# Patient Record
Sex: MALE | Race: OTHER | NOT HISPANIC OR LATINO | URBAN - METROPOLITAN AREA
[De-identification: names, ages, dates, MRNs, and addresses within clinical notes are randomized per-mention and may not be internally consistent; named-entity substitution may affect disease eponyms.]

---

## 2024-03-07 ENCOUNTER — OFFICE (OUTPATIENT)
Dept: URBAN - METROPOLITAN AREA CLINIC 11 | Facility: CLINIC | Age: 64
End: 2024-03-07

## 2024-03-07 VITALS
SYSTOLIC BLOOD PRESSURE: 136 MMHG | HEIGHT: 74 IN | HEART RATE: 87 BPM | OXYGEN SATURATION: 96 % | DIASTOLIC BLOOD PRESSURE: 74 MMHG | WEIGHT: 273 LBS

## 2024-03-07 DIAGNOSIS — K21.9 GASTRO-ESOPHAGEAL REFLUX DISEASE WITHOUT ESOPHAGITIS: ICD-10-CM

## 2024-03-07 DIAGNOSIS — R13.10 DYSPHAGIA, UNSPECIFIED: ICD-10-CM

## 2024-03-07 PROCEDURE — 99204 OFFICE O/P NEW MOD 45 MIN: CPT | Performed by: NURSE PRACTITIONER

## 2024-03-07 RX ORDER — SODIUM PICOSULFATE, MAGNESIUM OXIDE, AND ANHYDROUS CITRIC ACID 10; 3.5; 12 MG/160ML; G/160ML; G/160ML
LIQUID ORAL
Qty: 350 | Refills: 0 | Status: COMPLETED
Start: 2024-03-07 | End: 2024-04-02

## 2024-03-07 NOTE — SERVICENOTES
will get EGD to further eval dysphagia. he will continue omeprazole for GERD. overdue for LO. will get scheduled and get records from Dr. Francisco. will see back in 3 months ** he currently is not on Ozempic but discussed if he restarted the medication he would  need to hold medication 1 week prior to procedure

## 2024-03-07 NOTE — SERVICEHPINOTES
Mr. Harrison is a 63 year old male referred for GERD and dysphagia. He has a history of GERD that is well controlled on Omeprazole 40mg daily. He states for the past year he has had multiple episodes of feeling like food gets stuck in his throat when swallows. He denies any choking. He has some gas and bloating that is relieved with a bowel movement. He has pretty regular BMs and denies in change in bowel habits or blood in stool. He denies any n/v, heartburn, reflux, abdominal pain. He had a colonoscopy by Dr. Francisco 11 years ago.

## 2024-04-02 ENCOUNTER — AMBULATORY SURGICAL CENTER (OUTPATIENT)
Dept: URBAN - METROPOLITAN AREA SURGERY 2 | Facility: SURGERY | Age: 64
End: 2024-04-02
Payer: COMMERCIAL

## 2024-04-02 ENCOUNTER — OFFICE (OUTPATIENT)
Dept: URBAN - METROPOLITAN AREA PATHOLOGY 12 | Facility: PATHOLOGY | Age: 64
End: 2024-04-02
Payer: COMMERCIAL

## 2024-04-02 ENCOUNTER — AMBULATORY SURGICAL CENTER (OUTPATIENT)
Dept: URBAN - METROPOLITAN AREA SURGERY 2 | Facility: SURGERY | Age: 64
End: 2024-04-02

## 2024-04-02 VITALS
HEART RATE: 73 BPM | WEIGHT: 264 LBS | TEMPERATURE: 97.5 F | HEART RATE: 60 BPM | SYSTOLIC BLOOD PRESSURE: 137 MMHG | DIASTOLIC BLOOD PRESSURE: 75 MMHG | HEART RATE: 73 BPM | SYSTOLIC BLOOD PRESSURE: 131 MMHG | RESPIRATION RATE: 14 BRPM | OXYGEN SATURATION: 94 % | DIASTOLIC BLOOD PRESSURE: 77 MMHG | DIASTOLIC BLOOD PRESSURE: 77 MMHG | RESPIRATION RATE: 18 BRPM | OXYGEN SATURATION: 98 % | RESPIRATION RATE: 14 BRPM | DIASTOLIC BLOOD PRESSURE: 75 MMHG | OXYGEN SATURATION: 93 % | HEART RATE: 68 BPM | HEIGHT: 74 IN | HEART RATE: 68 BPM | OXYGEN SATURATION: 94 % | DIASTOLIC BLOOD PRESSURE: 91 MMHG | SYSTOLIC BLOOD PRESSURE: 137 MMHG | RESPIRATION RATE: 18 BRPM | DIASTOLIC BLOOD PRESSURE: 67 MMHG | SYSTOLIC BLOOD PRESSURE: 147 MMHG | RESPIRATION RATE: 16 BRPM | OXYGEN SATURATION: 99 % | HEART RATE: 68 BPM | DIASTOLIC BLOOD PRESSURE: 75 MMHG | RESPIRATION RATE: 16 BRPM | DIASTOLIC BLOOD PRESSURE: 79 MMHG | HEART RATE: 60 BPM | HEART RATE: 65 BPM | HEIGHT: 74 IN | HEART RATE: 65 BPM | OXYGEN SATURATION: 93 % | TEMPERATURE: 97.5 F | TEMPERATURE: 97.2 F | DIASTOLIC BLOOD PRESSURE: 67 MMHG | HEART RATE: 69 BPM | OXYGEN SATURATION: 98 % | SYSTOLIC BLOOD PRESSURE: 147 MMHG | HEART RATE: 69 BPM | OXYGEN SATURATION: 99 % | SYSTOLIC BLOOD PRESSURE: 131 MMHG | DIASTOLIC BLOOD PRESSURE: 79 MMHG | SYSTOLIC BLOOD PRESSURE: 130 MMHG | HEIGHT: 74 IN | HEART RATE: 69 BPM | WEIGHT: 264 LBS | DIASTOLIC BLOOD PRESSURE: 91 MMHG | SYSTOLIC BLOOD PRESSURE: 130 MMHG | TEMPERATURE: 97.2 F | DIASTOLIC BLOOD PRESSURE: 79 MMHG | DIASTOLIC BLOOD PRESSURE: 67 MMHG | SYSTOLIC BLOOD PRESSURE: 127 MMHG | WEIGHT: 264 LBS | OXYGEN SATURATION: 99 % | DIASTOLIC BLOOD PRESSURE: 77 MMHG | HEART RATE: 60 BPM | OXYGEN SATURATION: 98 % | OXYGEN SATURATION: 93 % | RESPIRATION RATE: 14 BRPM | SYSTOLIC BLOOD PRESSURE: 131 MMHG | SYSTOLIC BLOOD PRESSURE: 127 MMHG | HEART RATE: 73 BPM | TEMPERATURE: 97.2 F | TEMPERATURE: 97.5 F | HEART RATE: 65 BPM | RESPIRATION RATE: 18 BRPM | RESPIRATION RATE: 16 BRPM | SYSTOLIC BLOOD PRESSURE: 147 MMHG | DIASTOLIC BLOOD PRESSURE: 91 MMHG | SYSTOLIC BLOOD PRESSURE: 127 MMHG | OXYGEN SATURATION: 94 % | SYSTOLIC BLOOD PRESSURE: 130 MMHG | SYSTOLIC BLOOD PRESSURE: 137 MMHG

## 2024-04-02 DIAGNOSIS — B37.81 CANDIDAL ESOPHAGITIS: ICD-10-CM

## 2024-04-02 DIAGNOSIS — K20.90 ESOPHAGITIS, UNSPECIFIED WITHOUT BLEEDING: ICD-10-CM

## 2024-04-02 DIAGNOSIS — D12.0 BENIGN NEOPLASM OF CECUM: ICD-10-CM

## 2024-04-02 DIAGNOSIS — K21.9 GASTRO-ESOPHAGEAL REFLUX DISEASE WITHOUT ESOPHAGITIS: ICD-10-CM

## 2024-04-02 DIAGNOSIS — D12.8 BENIGN NEOPLASM OF RECTUM: ICD-10-CM

## 2024-04-02 DIAGNOSIS — D12.4 BENIGN NEOPLASM OF DESCENDING COLON: ICD-10-CM

## 2024-04-02 DIAGNOSIS — R13.10 DYSPHAGIA, UNSPECIFIED: ICD-10-CM

## 2024-04-02 DIAGNOSIS — K31.7 POLYP OF STOMACH AND DUODENUM: ICD-10-CM

## 2024-04-02 DIAGNOSIS — K62.1 RECTAL POLYP: ICD-10-CM

## 2024-04-02 DIAGNOSIS — K63.5 POLYP OF COLON: ICD-10-CM

## 2024-04-02 DIAGNOSIS — Z12.11 ENCOUNTER FOR SCREENING FOR MALIGNANT NEOPLASM OF COLON: ICD-10-CM

## 2024-04-02 PROCEDURE — 88312 SPECIAL STAINS GROUP 1: CPT | Performed by: PATHOLOGY

## 2024-04-02 PROCEDURE — 45385 COLONOSCOPY W/LESION REMOVAL: CPT | Mod: 33 | Performed by: INTERNAL MEDICINE

## 2024-04-02 PROCEDURE — 43239 EGD BIOPSY SINGLE/MULTIPLE: CPT | Mod: 51 | Performed by: INTERNAL MEDICINE

## 2024-04-02 PROCEDURE — 45380 COLONOSCOPY AND BIOPSY: CPT | Mod: 59 | Performed by: INTERNAL MEDICINE

## 2024-04-02 PROCEDURE — 88305 TISSUE EXAM BY PATHOLOGIST: CPT | Performed by: PATHOLOGY

## 2024-04-05 LAB
GASTRO ONE PATHOLOGY: PDF REPORT: (no result)

## 2024-04-15 PROBLEM — K63.5 POLYP OF COLON: Status: ACTIVE | Noted: 2024-04-02

## 2024-06-07 ENCOUNTER — OFFICE (OUTPATIENT)
Dept: URBAN - METROPOLITAN AREA CLINIC 11 | Facility: CLINIC | Age: 64
End: 2024-06-07

## 2024-06-07 VITALS
HEIGHT: 74 IN | OXYGEN SATURATION: 98 % | HEART RATE: 94 BPM | DIASTOLIC BLOOD PRESSURE: 94 MMHG | WEIGHT: 263 LBS | SYSTOLIC BLOOD PRESSURE: 172 MMHG

## 2024-06-07 DIAGNOSIS — D36.9 BENIGN NEOPLASM, UNSPECIFIED SITE: ICD-10-CM

## 2024-06-07 DIAGNOSIS — R13.10 DYSPHAGIA, UNSPECIFIED: ICD-10-CM

## 2024-06-07 DIAGNOSIS — K21.9 GASTRO-ESOPHAGEAL REFLUX DISEASE WITHOUT ESOPHAGITIS: ICD-10-CM

## 2024-06-07 PROCEDURE — 99213 OFFICE O/P EST LOW 20 MIN: CPT | Performed by: NURSE PRACTITIONER

## 2024-06-07 NOTE — SERVICEHPINOTES
Mr. Harrison is a 63 year old male referred for GERD and dysphagia. He has a history of GERD that is well controlled on Omeprazole 40mg daily. He states for the past year he has had multiple episodes of feeling like food gets stuck in his throat when swallows. He denies any choking. He has some gas and bloating that is relieved with a bowel movement. He has pretty regular BMs and denies in change in bowel habits or blood in stool. He denies any n/v, heartburn, reflux, abdominal pain.  he had EGD and colonoscopy on 04/02/2024.  EGD showed white plaques in the distal esophagus and a 5 mm polyp in the duodenum.   Pathology was positive for Candida.   He had  a 20 mm cecal polyp and other precancerous polyps.  His colon was noted to be long and redundant. He was referred to Dr. Harrison.  br
br In follow up on 6/7/24,  he is doing well.   His dysphagia has resolved since taking the fluconazole.   He started taking Metamucil and MiraLax and is having regular bowel movements daily.  His bloating is significantly better as well.   He is seen Dr. Harrison  and will be having the cecal polyp removed.  He denies any nausea, vomiting, abdominal pain.

## 2024-06-07 NOTE — SERVICENOTES
his dysphagia has resolved since taking the fluconazole.  He will continue his omeprazole for GERD.   He will follow back up with Dr. Harrison regarding polyp removal.  Will see back as needed